# Patient Record
Sex: MALE | Race: WHITE | NOT HISPANIC OR LATINO | Employment: FULL TIME | URBAN - METROPOLITAN AREA
[De-identification: names, ages, dates, MRNs, and addresses within clinical notes are randomized per-mention and may not be internally consistent; named-entity substitution may affect disease eponyms.]

---

## 2017-06-13 ENCOUNTER — CONVERSION ENCOUNTER (OUTPATIENT)
Dept: FAMILY MEDICINE CLINIC | Facility: CLINIC | Age: 42
End: 2017-06-13

## 2019-06-04 VITALS
RESPIRATION RATE: 16 BRPM | SYSTOLIC BLOOD PRESSURE: 124 MMHG | WEIGHT: 191 LBS | DIASTOLIC BLOOD PRESSURE: 82 MMHG | BODY MASS INDEX: 26.64 KG/M2 | HEART RATE: 72 BPM

## 2022-09-08 ENCOUNTER — OFFICE VISIT (OUTPATIENT)
Dept: FAMILY MEDICINE CLINIC | Facility: CLINIC | Age: 47
End: 2022-09-08

## 2022-09-08 ENCOUNTER — TELEPHONE (OUTPATIENT)
Dept: FAMILY MEDICINE CLINIC | Facility: CLINIC | Age: 47
End: 2022-09-08

## 2022-09-08 VITALS
RESPIRATION RATE: 14 BRPM | DIASTOLIC BLOOD PRESSURE: 86 MMHG | BODY MASS INDEX: 25.05 KG/M2 | SYSTOLIC BLOOD PRESSURE: 142 MMHG | WEIGHT: 175 LBS | HEART RATE: 75 BPM | HEIGHT: 70 IN | OXYGEN SATURATION: 99 %

## 2022-09-08 DIAGNOSIS — Z00.00 PREVENTATIVE HEALTH CARE: ICD-10-CM

## 2022-09-08 DIAGNOSIS — Z13.220 SCREENING, LIPID: ICD-10-CM

## 2022-09-08 DIAGNOSIS — Z11.59 ENCOUNTER FOR HEPATITIS C SCREENING TEST FOR LOW RISK PATIENT: Primary | ICD-10-CM

## 2022-09-08 DIAGNOSIS — R53.82 CHRONIC FATIGUE: ICD-10-CM

## 2022-09-08 DIAGNOSIS — R68.82 DECREASED SEX DRIVE: ICD-10-CM

## 2022-09-08 DIAGNOSIS — Z13.228 SCREENING FOR METABOLIC DISORDER: ICD-10-CM

## 2022-09-08 DIAGNOSIS — Z12.11 SCREEN FOR COLON CANCER: ICD-10-CM

## 2022-09-08 DIAGNOSIS — R03.0 ELEVATED BP WITHOUT DIAGNOSIS OF HYPERTENSION: ICD-10-CM

## 2022-09-08 PROCEDURE — 99386 PREV VISIT NEW AGE 40-64: CPT | Performed by: NURSE PRACTITIONER

## 2022-09-08 NOTE — TELEPHONE ENCOUNTER
At checkout patient requested to schedule follow up with Dr. Mac. Was est with Tamy today. Please advise if this is OK to schedule with Dr. COLON in 1 mo. Thank you.

## 2022-09-08 NOTE — PROGRESS NOTES
Subjective   Zhane García is a 47 y.o. male.     History of Present Illness   New patient here to establish care. He is c/o fatigue and low sex drive, wants to check testosterone. Due annual exam and labs.     Patient feels well overall except for some chronic fatigue and low sex drive times months.  He has taken [employer] supplement for testosterone replacement.  He is interested in having hormones checked and considering testosterone injections.  Patient denies erectile dysfunction or urinary changes.  He has had no fever, chills, weight loss.    Patient history of venous stasis ulcers to bilateral medial ankles.  He is under gone vein stripping in the past bilaterally.  No wounds.  Patient denies numbness or tingling.  Patient has stopped smoking recently.    The following portions of the patient's history were reviewed and updated as appropriate: allergies, current medications, past family history, past medical history, past social history, past surgical history and problem list.    Review of Systems   Constitutional: Positive for fatigue. Negative for activity change, diaphoresis, fever and unexpected weight loss.   HENT: Negative for congestion and sore throat.         Dental exam is utd     Eyes: Negative for visual disturbance.        Lasik     Respiratory: Negative for cough, shortness of breath and wheezing.    Cardiovascular: Negative for chest pain, palpitations and leg swelling.   Gastrointestinal: Negative for abdominal distention, blood in stool, constipation, diarrhea and GERD.   Endocrine: Negative for cold intolerance, heat intolerance, polydipsia, polyphagia and polyuria.   Genitourinary: Negative for dysuria, erectile dysfunction, scrotal swelling and urinary incontinence.   Musculoskeletal: Negative for back pain.   Skin: Negative for bruise.   Allergic/Immunologic: Negative for environmental allergies.   Neurological: Negative for syncope, weakness and headache.   Hematological: Does not  bruise/bleed easily.   Psychiatric/Behavioral: Positive for stress. Negative for sleep disturbance and depressed mood. The patient is nervous/anxious.        Objective   Physical Exam  Vitals reviewed.   Constitutional:       Appearance: Normal appearance. He is normal weight.   HENT:      Head: Normocephalic.      Right Ear: Tympanic membrane normal.      Left Ear: Tympanic membrane normal.      Nose: Nose normal.      Mouth/Throat:      Mouth: Mucous membranes are moist.   Eyes:      Pupils: Pupils are equal, round, and reactive to light.   Cardiovascular:      Rate and Rhythm: Normal rate and regular rhythm.      Pulses: Normal pulses.      Heart sounds: Normal heart sounds.   Pulmonary:      Effort: Pulmonary effort is normal.      Breath sounds: Normal breath sounds.   Abdominal:      General: Abdomen is flat. Bowel sounds are normal.      Palpations: Abdomen is soft.   Musculoskeletal:         General: Normal range of motion.      Cervical back: Normal range of motion.   Skin:     General: Skin is warm.   Neurological:      General: No focal deficit present.      Mental Status: He is alert.   Psychiatric:         Mood and Affect: Mood normal.         Vitals:    09/08/22 1049   BP: 142/86   Pulse: 75   Resp: 14   SpO2: 99%     Body mass index is 25.11 kg/m².    Procedures    Assessment & Plan   Problems Addressed this Visit    None     Visit Diagnoses     Encounter for hepatitis C screening test for low risk patient    -  Primary    Relevant Orders    Hepatitis C Antibody    Preventative health care        Chronic fatigue        Relevant Orders    TSH    Decreased sex drive        Elevated BP without diagnosis of hypertension        Relevant Orders    CBC & Differential    Comprehensive Metabolic Panel    TSH    Screening for metabolic disorder        Screening, lipid        Relevant Orders    Lipid Panel With / Chol / HDL Ratio    Screen for colon cancer        Relevant Orders    Cologuard - Stool, Per Rectum       Diagnoses       Codes Comments    Encounter for hepatitis C screening test for low risk patient    -  Primary ICD-10-CM: Z11.59  ICD-9-CM: V73.89     Preventative health care     ICD-10-CM: Z00.00  ICD-9-CM: V70.0     Chronic fatigue     ICD-10-CM: R53.82  ICD-9-CM: 780.79     Decreased sex drive     ICD-10-CM: R68.82  ICD-9-CM: 799.81     Elevated BP without diagnosis of hypertension     ICD-10-CM: R03.0  ICD-9-CM: 796.2     Screening for metabolic disorder     ICD-10-CM: Z13.228  ICD-9-CM: V77.99     Screening, lipid     ICD-10-CM: Z13.220  ICD-9-CM: V77.91     Screen for colon cancer     ICD-10-CM: Z12.11  ICD-9-CM: V76.51         Orders Placed This Encounter   Procedures   • Cologuard - Stool, Per Rectum     Standing Status:   Future     Standing Expiration Date:   9/8/2023     Order Specific Question:   Release to patient     Answer:   Routine Release   • Comprehensive Metabolic Panel     Order Specific Question:   Release to patient     Answer:   Routine Release   • Hepatitis C Antibody     Order Specific Question:   Release to patient     Answer:   Routine Release   • Lipid Panel With / Chol / HDL Ratio     Order Specific Question:   Release to patient     Answer:   Routine Release   • TSH     Order Specific Question:   Release to patient     Answer:   Routine Release   • CBC & Differential     Preventative care- Follow heart healthy diet, drink water, walk daily. Wear seatbelts, wear helmets, wear sunscreens. Follow CDC guidelines for covid pandemic.     Fatigue- check labs, encourage heart healthy diet, drink water.  Limit alcohol or chemical depressant.  Reviewed sleep hygiene    Elevated BP without diagnosis of hypertension-limit energy drinks/caffeine, drink water, low-sodium diet limit stress and stimulants, check lab    Low sex drive-refer to Dr. Mac.  Patient declines urology referral.  He is aware that this provider does not check or prescribe testosterone         Education provided in AVS    Return in about 1 month (around 10/8/2022) for Recheck.

## 2022-09-09 LAB
ALBUMIN SERPL-MCNC: 4.7 G/DL (ref 4–5)
ALBUMIN/GLOB SERPL: 2 {RATIO} (ref 1.2–2.2)
ALP SERPL-CCNC: 77 IU/L (ref 44–121)
ALT SERPL-CCNC: 23 IU/L (ref 0–44)
AST SERPL-CCNC: 25 IU/L (ref 0–40)
BASOPHILS # BLD AUTO: 0.1 X10E3/UL (ref 0–0.2)
BASOPHILS NFR BLD AUTO: 1 %
BILIRUB SERPL-MCNC: 0.3 MG/DL (ref 0–1.2)
BUN SERPL-MCNC: 15 MG/DL (ref 6–24)
BUN/CREAT SERPL: 14 (ref 9–20)
CALCIUM SERPL-MCNC: 9.6 MG/DL (ref 8.7–10.2)
CHLORIDE SERPL-SCNC: 100 MMOL/L (ref 96–106)
CHOLEST SERPL-MCNC: 181 MG/DL (ref 100–199)
CHOLEST/HDLC SERPL: 3.3 RATIO (ref 0–5)
CO2 SERPL-SCNC: 23 MMOL/L (ref 20–29)
CREAT SERPL-MCNC: 1.09 MG/DL (ref 0.76–1.27)
EGFRCR-CYS SERPLBLD CKD-EPI 2021: 85 ML/MIN/1.73
EOSINOPHIL # BLD AUTO: 0.3 X10E3/UL (ref 0–0.4)
EOSINOPHIL NFR BLD AUTO: 3 %
ERYTHROCYTE [DISTWIDTH] IN BLOOD BY AUTOMATED COUNT: 12.5 % (ref 11.6–15.4)
GLOBULIN SER CALC-MCNC: 2.3 G/DL (ref 1.5–4.5)
GLUCOSE SERPL-MCNC: 97 MG/DL (ref 65–99)
HCT VFR BLD AUTO: 44.6 % (ref 37.5–51)
HCV AB S/CO SERPL IA: <0.1 S/CO RATIO (ref 0–0.9)
HDLC SERPL-MCNC: 55 MG/DL
HGB BLD-MCNC: 15.1 G/DL (ref 13–17.7)
IMM GRANULOCYTES # BLD AUTO: 0 X10E3/UL (ref 0–0.1)
IMM GRANULOCYTES NFR BLD AUTO: 0 %
LDLC SERPL CALC-MCNC: 115 MG/DL (ref 0–99)
LYMPHOCYTES # BLD AUTO: 2.6 X10E3/UL (ref 0.7–3.1)
LYMPHOCYTES NFR BLD AUTO: 30 %
MCH RBC QN AUTO: 32.5 PG (ref 26.6–33)
MCHC RBC AUTO-ENTMCNC: 33.9 G/DL (ref 31.5–35.7)
MCV RBC AUTO: 96 FL (ref 79–97)
MONOCYTES # BLD AUTO: 0.8 X10E3/UL (ref 0.1–0.9)
MONOCYTES NFR BLD AUTO: 9 %
NEUTROPHILS # BLD AUTO: 4.9 X10E3/UL (ref 1.4–7)
NEUTROPHILS NFR BLD AUTO: 57 %
PLATELET # BLD AUTO: 335 X10E3/UL (ref 150–450)
POTASSIUM SERPL-SCNC: 4.7 MMOL/L (ref 3.5–5.2)
PROT SERPL-MCNC: 7 G/DL (ref 6–8.5)
RBC # BLD AUTO: 4.64 X10E6/UL (ref 4.14–5.8)
SODIUM SERPL-SCNC: 140 MMOL/L (ref 134–144)
TRIGL SERPL-MCNC: 59 MG/DL (ref 0–149)
TSH SERPL DL<=0.005 MIU/L-ACNC: 1.66 UIU/ML (ref 0.45–4.5)
VLDLC SERPL CALC-MCNC: 11 MG/DL (ref 5–40)
WBC # BLD AUTO: 8.7 X10E3/UL (ref 3.4–10.8)

## 2022-10-13 ENCOUNTER — OFFICE VISIT (OUTPATIENT)
Dept: FAMILY MEDICINE CLINIC | Facility: CLINIC | Age: 47
End: 2022-10-13

## 2022-10-13 VITALS
RESPIRATION RATE: 18 BRPM | SYSTOLIC BLOOD PRESSURE: 128 MMHG | DIASTOLIC BLOOD PRESSURE: 82 MMHG | HEART RATE: 68 BPM | WEIGHT: 175 LBS | BODY MASS INDEX: 25.11 KG/M2 | OXYGEN SATURATION: 99 %

## 2022-10-13 DIAGNOSIS — R68.82 DECREASED LIBIDO: ICD-10-CM

## 2022-10-13 DIAGNOSIS — R53.83 OTHER FATIGUE: Primary | ICD-10-CM

## 2022-10-13 PROCEDURE — 99213 OFFICE O/P EST LOW 20 MIN: CPT | Performed by: FAMILY MEDICINE

## 2022-10-13 NOTE — PROGRESS NOTES
Chief Complaint  chronic fatigue    Subjective    History of Present Illness {CC  Problem List  Visit  Diagnosis   Encounters  Notes  Medications  Labs  Result Review Imaging  Media :23}     Zhane García presents to [facility] for chronic fatigue.  History of Present Illness     Here with concerns for some chronic fatigue and decreased libido. Was advised to make this appointment by his current girlfriend. They have been dating about 5 months now and she has expressed some concern over his libido. He feels that things are overall going fairly well, only that he is rather busy and stressed these days.    Exercises regularly, is more or less a single parent to 2 adolescent children. Works full-time and travels regularly. Recent blood work was overall reassuring. Has no problems with erectile function. Able to achieve erection and orgasm without difficulty.    Objective     Vital Signs:   /82   Pulse 68   Resp 18   Wt 79.4 kg (175 lb)   SpO2 99%   BMI 25.11 kg/m²   Physical Exam  Vitals and nursing note reviewed.   Constitutional:       General: He is not in acute distress.     Appearance: Normal appearance. He is not ill-appearing.   Cardiovascular:      Rate and Rhythm: Normal rate and regular rhythm.      Pulses: Normal pulses.      Heart sounds: Normal heart sounds. No murmur heard.  Pulmonary:      Effort: Pulmonary effort is normal. No respiratory distress.      Breath sounds: Normal breath sounds. No rales.   Neurological:      Mental Status: He is alert and oriented to person, place, and time. Mental status is at baseline.   Psychiatric:         Mood and Affect: Mood normal.         Behavior: Behavior normal.          Result Review  Data Reviewed:{ Labs  Result Review  Imaging  Med Tab  Media :23}                   Assessment and Plan {CC Problem List  Visit Diagnosis  ROS  Review (Popup)  Health Maintenance  Quality  BestPractice  Medications   TapTrack  SnapShot Encounters  Media :23}   Diagnoses and all orders for this visit:    1. Other fatigue (Primary)    2. Decreased libido    Long discussion about libido changes in men as they get older. Sounds as if some more open communication between him and his girlfriend might be helpful. He defers checking testosterone for now. We could certainly reconsider in the future if needed. Discussed risks of long-term testosterone supplementation.    Follow-up as needed. Encouraged communication via [employer] in the meantime.    Patient was given instructions and counseling regarding his condition or for health maintenance advice. Please see specific information pulled into the AVS (placed there by myself) if appropriate.    Return if symptoms worsen or fail to improve.      GARRETT Mac MD

## 2023-07-13 ENCOUNTER — TELEPHONE (OUTPATIENT)
Dept: PODIATRY | Facility: CLINIC | Age: 48
End: 2023-07-13

## 2023-07-13 NOTE — TELEPHONE ENCOUNTER
Provider: DR FORBES   Caller: YAJAIRA ESCAMILLA   Relationship to Patient: PATIENT   Pharmacy:   [facility][id number] - [facility] [place] - [address] AT [address] [place] [address] & [facility] - [phone]  - [phone]    [address] [place] [address]   Phone: [phone] Fax: [phone]     Phone Number: [phone]  Reason for Call: PATIENT SEEN TODAY 07/13/23.  RX methylPREDNISolone (MEDROL) 4 MG dose pack   HAS NOT BEEN RECEIVED AT PHARMACY.      PATIENT STATES HE IS GOING OUT OF TOWN AND WOULD LIKE THE RX SENT ONLY ONE TIME TO   [facility]  [address] [place] [address] [place]  [phone]

## 2023-07-13 NOTE — TELEPHONE ENCOUNTER
I called  Zhane and told him that we have conformation of the medication at 11:52 a.m and it was sent to the [facility] in [place] on [address]. He verbally understood and said he would pick it up later today.

## 2023-07-27 ENCOUNTER — OFFICE VISIT (OUTPATIENT)
Dept: PODIATRY | Facility: CLINIC | Age: 48
End: 2023-07-27
Payer: COMMERCIAL

## 2023-07-27 VITALS — RESPIRATION RATE: 20 BRPM | HEIGHT: 70 IN | WEIGHT: 175 LBS | OXYGEN SATURATION: 98 % | BODY MASS INDEX: 25.05 KG/M2

## 2023-07-27 DIAGNOSIS — S86.011A ACHILLES TENDON RUPTURE, RIGHT, INITIAL ENCOUNTER: ICD-10-CM

## 2023-07-27 DIAGNOSIS — M79.604 RIGHT LEG PAIN: Primary | ICD-10-CM

## 2023-07-27 NOTE — PROGRESS NOTES
"07/27/2023  Foot and Ankle Surgery - Established Patient/Follow-up  Provider: Dr. Guicho Woodson DPM  Location: [facility]    Subjective:  Zhane García is a 48 y.o. male.     Chief Complaint   Patient presents with    Right Foot - Pain       achiles rupture        Follow-up     WILDER Fairchild 9/8/2022       HPI: Zhane García is a 48-year-old male who presents to the office today for a follow-up regarding his right Achilles tendon.    The patient is doing well overall and notes improvement in his pain. He states that the aggravating pain is nearly resolved as long as he is not trying to apply pressure. He has attempted to ambulate in the boot without crutches; however, he has not attempted to bear weight on his right foot. The patient has attempted to rest his right foot when standing. He works as a . He is 3 weeks status post injury.    No Known Allergies    Current Outpatient Medications on File Prior to Visit   Medication Sig Dispense Refill    HYDROcodone-acetaminophen (NORCO) 5-325 MG per tablet Take 1 tablet by mouth Every 6 (Six) Hours As Needed for Severe Pain. 12 tablet 0    ibuprofen (ADVIL,MOTRIN) 600 MG tablet Take 1 tablet by mouth Every 6 (Six) Hours As Needed for Mild Pain. 30 tablet 0    methylPREDNISolone (MEDROL) 4 MG dose pack Take as directed 21 tablet 0     No current facility-administered medications on file prior to visit.       Objective   Resp 20   Ht 177.8 cm (70\")   Wt 79.4 kg (175 lb)   SpO2 98%   BMI 25.11 kg/m²     Foot/Ankle Exam    GENERAL  Orientation:  AAOx3  Affect:  appropriate    VASCULAR     Right Foot Vascularity   Normal vascular exam    Dorsalis pedis:  2+  Posterior tibial:  2+  Skin temperature:  warm  Edema grading:  None  CFT:  < 3 seconds  Pedal hair growth:  Present  Varicosities:  none     Left Foot Vascularity   Normal vascular exam    Dorsalis pedis:  2+  Posterior tibial:  2+  Skin temperature:  warm  Edema grading:  None  CFT:  < 3 " seconds  Pedal hair growth:  Present  Varicosities:  none     NEUROLOGIC     Right Foot Neurologic   Light touch sensation: normal  Hot/Cold sensation: normal  Achilles reflex:  2+     Left Foot Neurologic   Light touch sensation: normal  Hot/Cold sensation:  normal  Achilles reflex:  2+    MUSCULOSKELETAL     Right Foot Musculoskeletal   Arch:  Normal     Left Foot Musculoskeletal   Arch:  Normal    MUSCLE STRENGTH     Right Foot Muscle Strength   Normal strength    Foot dorsiflexion:  5  Foot plantar flexion:  5  Foot inversion:  5  Foot eversion:  5     Left Foot Muscle Strength   Normal strength    Foot dorsiflexion:  5  Foot plantar flexion:  5  Foot inversion:  5  Foot eversion:  5    DERMATOLOGIC      Right Foot Dermatologic   Skin  Right foot skin is intact.   Nails comment:  Nails 1-5     Left Foot Dermatologic   Skin  Left foot skin is intact.   Nails comment:  Nails 1-5    TESTS     Right Foot Tests   Anterior drawer: negative  Varus tilt: negative     Left Foot Tests   Anterior drawer: negative  Varus tilt: negative     Right foot additional comments: Moderate discomfort with palpation involving the myotendinous juncture of the right lower extremity. Significant inflammation and swelling to this area. No gross deformity or instability. Limited range of motion and muscle strength testing secondary to guarding.    0727/2023  Continued discomfort involving the calf region. Mild hypertrophy. No progressive disruption involving the Achilles tendon.    Assessment & Plan   Diagnoses and all orders for this visit:    1. Right leg pain (Primary)    2. Achilles tendon rupture, right, initial encounter  -     Ambulatory Referral to Physical Therapy Evaluate and treat      The patient is a 48-year-old male who presents to the office today for a follow-up regarding his right Achilles tendon. He reports improvement in his symptoms since his last visit. We removed his wedge today. I advised the patient to begin partial  weight-bearing with the use of crutches. He may discontinue use of the crutches as he feels comfortable. I recommend physical therapy. I advised the patient to move his right ankle and foot. Continue to wear the boot for another 2 weeks. We discussed PRP injections. The patient will return to the office in 3 weeks for reevaluation. Greater than 20 minutes was spent before, during, and after evaluation for patient care.    Orders Placed This Encounter   Procedures    Ambulatory Referral to Physical Therapy Evaluate and treat     Referral Priority:   Routine     Referral Type:   Physical Therapy     Referral Reason:   Specialty Services Required     Requested Specialty:   Physical Therapy     Number of Visits Requested:   1          Note is dictated utilizing voice recognition software. Unfortunately this leads to occasional typographical errors. I apologize in advance if the situation occurs. If questions occur please do not hesitate to call our office.    Transcribed from ambient dictation for YOSI Woodson DPM by Dov Edwards.  07/27/23   10:58 EDT    Patient or patient representative verbalized consent to the visit recording.  I have personally performed the services described in this document as transcribed by the above individual, and it is both accurate and complete.

## 2023-08-10 ENCOUNTER — TREATMENT (OUTPATIENT)
Dept: PHYSICAL THERAPY | Facility: CLINIC | Age: 48
End: 2023-08-10
Payer: COMMERCIAL

## 2023-08-10 DIAGNOSIS — R26.9 GAIT DISTURBANCE: ICD-10-CM

## 2023-08-10 DIAGNOSIS — S86.011A ACHILLES TENDON RUPTURE, RIGHT, INITIAL ENCOUNTER: Primary | ICD-10-CM

## 2023-08-10 NOTE — PROGRESS NOTES
" Physical Therapy Initial Evaluation and Plan of Care        [address], [address] [place] [address]     Patient: Zhane García   : 1975  Diagnosis/ICD-10 Code:  Achilles tendon rupture, right, initial encounter [S86.011A]  Referring practitioner: YOSI Woodson DPM  Date of Initial Visit: 8/10/2023  Today's Date: 8/10/2023  Patient seen for 1 sessions           Subjective Questionnaire: FAAM:  42% limited       Subjective Evaluation    History of Present Illness  Onset date: 23.  Mechanism of injury: Subjective report per Dr. Woodson at office visit on 23:  \"He reports he was attempting to push a truck out of a yard on 2023, he fell and felt as if a \"rubber band\" snapped in the posterior aspect of his calf. The patient states he was seen by Dr. Albarado, the emergency room physician, who ordered an MRI. He notes he was informed to stay off of his right lower extremity. He reports he was prescribed hydrocodone, Tylenol, and ibuprofen.\"             Patient Occupation:  and works from home primarily Pain  Current pain rating: 3  At best pain ratin  At worst pain ratin  Location: right ankle and calf  Quality: dull ache  Relieving factors: rest and change in position  Aggravating factors: sleeping and stairs  Progression: no change    Social Support  Lives in: condominium  Lives with: young children and adult children    Hand dominance: right    Diagnostic Tests  MRI studies: abnormal    Treatments  Previous treatment: medication and immobilization  Current treatment: physical therapy  Patient Goals  Patient goals for therapy: independence with ADLs/IADLs, decreased edema, decreased pain, improved balance, increased motion and increased strength  Patient goal: housework, walking and gym       Precautions: cam boot with heel wedge; gentle ROM, no weightbearing, conservative care, avoid high impact or excessive activity    MRI 23 Findings:  The Achilles tendon is " ruptured at the myotendinous junction of the tendon with the soleus muscle. The proximal tendon is mildly retracted and serpiginous in contour. The remainder of the Achilles tendon demonstrates some increased signal within its   substance. It appears to insert normally distally. There is fluid and edema at the myotendinous junction of the soleus muscle with tendon. There is a small amount of edema and fluid in the fat anterior to the distal Achilles tendon. There is no acute   marrow edema. The ankle joint is maintained. The peroneus longus and brevis tendons appear to be intact as do the medial flexor tendons.     IMPRESSION:  Impression:  The Achilles tendon is ruptured at the myotendinous junction with the soleus muscle. There is mild separation of the tendon ends. Surrounding fluid presumably represents hemorrhage.    Objective          Observations     Right Ankle/Foot   Positive for edema.       Palpation     Right   No palpable tenderness to the anterior tibialis, lateral gastrocnemius, medial gastrocnemius, posterior tibialis and soleus.   Hypertonic in the peroneus. Tenderness of the peroneus.     Additional Palpation Details  Swelling at soleus R    Tenderness     Right Ankle/Foot   No tenderness in the Achilles insertion, anterior ankle, anterior talofibular ligament, fifth metatarsal base, calcaneofibular ligament, deltoid ligament, dorsum foot, fibula, first metatarsal head, lateral malleolus, medial calcaneus, medial malleolus, metatarsal head, navicular, peroneal tendon and plantar fascia.     Neurological Testing     Sensation     Ankle/Foot     Right Ankle/Foot   Intact: light touch     Additional Neurological Details  Denies paraesthesias    Active Range of Motion     Right Ankle/Foot   Plantar flexion: 50 degrees   Inversion: 45 degrees   Eversion: 10 degrees     Additional Active Range of Motion Details  -5 degrees of ankle DF R    Joint Play     Right Ankle/Foot  Joints within functional limits  are the forefoot. Hypomobile in the fibular head.     Strength/Myotome Testing     Right Ankle/Foot   Dorsiflexion: 5  Inversion: 4+  Eversion: 5  Great toe flexion: 4+  Great toe extension: 4+    Swelling   Left Ankle/Foot   Figure 8: 53 cm    Right Ankle/Foot   Figure 8: 53 cm     General Comments     Ankle/Foot Comments   Swelling in right calf and distal soleus mm   See Exercise, Manual, and Modality Logs for complete treatment.     Assessment & Plan     Assessment  Impairments: abnormal gait, abnormal muscle tone, abnormal or restricted ROM, activity intolerance, impaired balance, impaired physical strength, lacks appropriate home exercise program, pain with function and weight-bearing intolerance  Functional Limitations: sleeping, walking, uncomfortable because of pain and standing  Assessment details: The patient is a 48 y.o. male who presents to physical therapy today for right achilles tendon rupture. Upon initial evaluation, the patient demonstrates the following impairments: right ankle ROM and strength deficits, gait deficits, functional impairments. Due to these impairments, the patient is unable to walk with weight on the right foot, perform ADLs, perform housework, and gym workouts, . The patient would benefit from skilled PT services to address functional limitations and impairments and to improve patient quality of life.        Goals  Plan Goals: STGs (4 weeks):    1.  Pt will be able to tolerated initial HEP.  2.  Pt will demonstrate increased R ankle DF to neutral  3.  Pt will be able to walk in boot without crutches.    LTGs (12 weeks):  1.  Pt will be I with HEP.  2.  Pt will demonstrate R ankle DF to 20 degrees.  3.  Pt will be able to perform housework with no more than 2/10 pain level.      Plan  Therapy options: will be seen for skilled therapy services  Planned modality interventions: cryotherapy, dry needling, high voltage pulsed current (pain management), TENS, thermotherapy  (hydrocollator packs) and ultrasound  Planned therapy interventions: manual therapy, neuromuscular re-education, flexibility, functional ROM exercises, gait training, home exercise program, joint mobilization, soft tissue mobilization, strengthening, stretching, therapeutic activities and balance/weight-bearing training  Frequency: 2x week  Duration in weeks: 12  Treatment plan discussed with: patient      Visit Diagnoses:    ICD-10-CM ICD-9-CM   1. Achilles tendon rupture, right, initial encounter  S86.011A 845.09   2. Gait disturbance  R26.9 781.2       History # of Personal Factors and/or Comorbidities: LOW (0)  Examination of Body System(s): # of elements: LOW (1-2)  Clinical Presentation: STABLE   Clinical Decision Making: LOW       Timed:         Manual Therapy:         mins  44554;     Therapeutic Exercise:    10     mins  76933;     Neuromuscular Melecio:        mins  20562;    Therapeutic Activity:          mins  78740;     Gait Training:           mins  27906;     Ultrasound:          mins  75849;    Ionto                                   mins   35129  Self Care                            mins   93143  Canalith Repos         mins 60262      Un-Timed:  Electrical Stimulation:         mins  05171 ( );  Dry Needling          mins self-pay  Traction          mins 47049  Low Eval     35     Mins  77437  Mod Eval          Mins  37744  High Eval                            Mins  37519  Re-Eval                               mins  78794    Timed Treatment:   10   mins   Total Treatment:     45   mins    PT SIGNATURE: Aishwarya Albright PT         Initial Certification  Certification Period: 8/10/2023 thru 11/8/2023  I certify that the therapy services are furnished while this patient is under my care.  The services outlined above are required by this patient, and will be reviewed every 90 days.     PHYSICIAN: YOSI Woodson DPM      DATE:     Please sign and return via fax to [phone].. Thank you, [facility].

## 2023-08-17 ENCOUNTER — TREATMENT (OUTPATIENT)
Dept: PHYSICAL THERAPY | Facility: CLINIC | Age: 48
End: 2023-08-17
Payer: COMMERCIAL

## 2023-08-17 DIAGNOSIS — R26.9 GAIT DISTURBANCE: ICD-10-CM

## 2023-08-17 DIAGNOSIS — S86.011A ACHILLES TENDON RUPTURE, RIGHT, INITIAL ENCOUNTER: Primary | ICD-10-CM

## 2023-08-17 NOTE — PROGRESS NOTES
Physical Therapy Daily Treatment Note  [facility], [address] [place] [address]     Patient: Zhane García   : 1975  Referring practitioner: YOSI Woodson DPM  Diagnosis:      ICD-10-CM ICD-9-CM   1. Achilles tendon rupture, right, initial encounter  S86.011A 845.09   2. Gait disturbance  R26.9 781.2     Date of Initial Visit: Type: THERAPY  Noted: 8/10/2023  Today's Date: 2023    VISIT#: 2          Subjective   Zhane García reports: he has been walking without crutches for 2 days with some increased fatigue noted from wearing boot and not having full motion, but no increased pain or swelling.      Objective   See Exercise, Manual, and Modality Logs for complete treatment.       Assessment & Plan       Assessment  Assessment details: He will see Dr. Woodson on Tuesday and will progress as able per restrictions provided.          Progress per Plan of Care and Progress strengthening /stabilization /functional activity           Timed:  Manual Therapy:    12     mins  88581;  Therapeutic Exercise:    11     mins  32254;     Neuromuscular Melecio:        mins  93061;    Therapeutic Activity:          mins  66622;     Gait Training:           mins  85767;     Ultrasound:          mins  63916;    Electrical Stimulation:         mins  32202 ( );    Untimed:  Electrical Stimulation:    20     mins  19356 ( );  Mechanical Traction:         mins  19055;   Dry needling:       Self pay    Timed Treatment:   23   mins   Total Treatment:     43   mins  Aishwarya Albright, PT, DPT, CLT, CIDN  Physical Therapist

## 2023-08-22 ENCOUNTER — OFFICE VISIT (OUTPATIENT)
Dept: PODIATRY | Facility: CLINIC | Age: 48
End: 2023-08-22
Payer: COMMERCIAL

## 2023-08-22 VITALS — OXYGEN SATURATION: 98 % | WEIGHT: 175 LBS | HEIGHT: 70 IN | HEART RATE: 81 BPM | BODY MASS INDEX: 25.05 KG/M2

## 2023-08-22 DIAGNOSIS — S86.011A ACHILLES TENDON RUPTURE, RIGHT, INITIAL ENCOUNTER: ICD-10-CM

## 2023-08-22 DIAGNOSIS — M79.604 RIGHT LEG PAIN: Primary | ICD-10-CM

## 2023-08-22 NOTE — PROGRESS NOTES
"08/22/2023  Foot and Ankle Surgery - Established Patient/Follow-up  Provider: Dr. Guicho Woodson DPM  Location: [facility]    Subjective:  Zhane García is a 48 y.o. male.     Chief Complaint   Patient presents with    Right Lower Leg - Follow-up     Achilles Rupture     Follow-up     NORIS ARVIZU 10/2022       HPI: The patient is a 48-year-old male who presents to the clinic for a follow-up regarding his right Achilles tendon. He is accompanied by an adult female.    The patient states he is doing well and has been without crutches for the last week. He reports he has attended 2 sessions of physical therapy. He notes he has been wearing the boot for 5 to 6 weeks. The patient states he has been trying to wean himself out of the boot around the house.    No Known Allergies    Current Outpatient Medications on File Prior to Visit   Medication Sig Dispense Refill    HYDROcodone-acetaminophen (NORCO) 5-325 MG per tablet Take 1 tablet by mouth Every 6 (Six) Hours As Needed for Severe Pain. (Patient not taking: Reported on 8/22/2023) 12 tablet 0    ibuprofen (ADVIL,MOTRIN) 600 MG tablet Take 1 tablet by mouth Every 6 (Six) Hours As Needed for Mild Pain. (Patient not taking: Reported on 8/22/2023) 30 tablet 0    methylPREDNISolone (MEDROL) 4 MG dose pack Take as directed (Patient not taking: Reported on 8/22/2023) 21 tablet 0     No current facility-administered medications on file prior to visit.       Objective   Pulse 81   Ht 177.8 cm (70\")   Wt 79.4 kg (175 lb)   SpO2 98%   BMI 25.11 kg/mý     Foot/Ankle Exam  GENERAL  Orientation:  AAOx3  Affect:  appropriate    VASCULAR     Right Foot Vascularity   Normal vascular exam    Dorsalis pedis:  2+  Posterior tibial:  2+  Skin temperature:  warm  Edema grading:  None  CFT:  < 3 seconds  Pedal hair growth:  Present  Varicosities:  none     Left Foot Vascularity   Normal vascular exam    Dorsalis pedis:  2+  Posterior tibial:  2+  Skin temperature:  " warm  Edema grading:  None  CFT:  < 3 seconds  Pedal hair growth:  Present  Varicosities:  none     NEUROLOGIC     Right Foot Neurologic   Light touch sensation: normal  Hot/Cold sensation: normal  Achilles reflex:  2+     Left Foot Neurologic   Light touch sensation: normal  Hot/Cold sensation:  normal  Achilles reflex:  2+    MUSCULOSKELETAL     Right Foot Musculoskeletal   Arch:  Normal     Left Foot Musculoskeletal   Arch:  Normal    MUSCLE STRENGTH     Right Foot Muscle Strength   Normal strength    Foot dorsiflexion:  5  Foot plantar flexion:  5  Foot inversion:  5  Foot eversion:  5     Left Foot Muscle Strength   Normal strength    Foot dorsiflexion:  5  Foot plantar flexion:  5  Foot inversion:  5  Foot eversion:  5    DERMATOLOGIC      Right Foot Dermatologic   Skin  Right foot skin is intact.   Nails comment:  Nails 1-5     Left Foot Dermatologic   Skin  Left foot skin is intact.   Nails comment:  Nails 1-5    TESTS     Right Foot Tests   Anterior drawer: negative  Varus tilt: negative     Left Foot Tests   Anterior drawer: negative  Varus tilt: negative     Right foot additional comments: Moderate discomfort with palpation involving the myotendinous juncture of the right lower extremity. Significant inflammation and swelling to this area. No gross deformity or instability. Limited range of motion and muscle strength testing secondary to guarding.    0727/2023  Continued discomfort involving the calf region. Mild hypertrophy. No progressive disruption involving the Achilles tendon.    08/22/2023: Less swelling and discomfort involving the calf region. Strength is improving slowly. No additional issues or concerns today.    Assessment & Plan   Diagnoses and all orders for this visit:    1. Right leg pain (Primary)    2. Achilles tendon rupture, right, initial encounter        The patient is a 48-year-old male who presents to the office today for a follow-up regarding his right Achilles tendon. His strength  is improving slowly. No additional issues or concerns today. I advised the patient to begin transitioning out of the boot and progress with physical therapy. I advised the patient to perform low impact exercises. The patient will return to the office in 6 weeks for reevaluation. Greater than 20 minutes was spent before, during, and after evaluation for patient care.      No orders of the defined types were placed in this encounter.         Note is dictated utilizing voice recognition software. Unfortunately this leads to occasional typographical errors. I apologize in advance if the situation occurs. If questions occur please do not hesitate to call our office.    Transcribed from ambient dictation for YOSI Woodson DPM by Purvi Browne.  08/22/23   10:17 EDT    Patient or patient representative verbalized consent to the visit recording.  I have personally performed the services described in this document as transcribed by the above individual, and it is both accurate and complete.

## 2023-08-23 ENCOUNTER — TREATMENT (OUTPATIENT)
Dept: PHYSICAL THERAPY | Facility: CLINIC | Age: 48
End: 2023-08-23
Payer: COMMERCIAL

## 2023-08-23 DIAGNOSIS — R26.9 GAIT DISTURBANCE: ICD-10-CM

## 2023-08-23 DIAGNOSIS — S86.011A ACHILLES TENDON RUPTURE, RIGHT, INITIAL ENCOUNTER: Primary | ICD-10-CM

## 2023-08-23 NOTE — PROGRESS NOTES
Physical Therapy Daily Treatment Note  [facility] [place] [facility], [address] [place] [address]     Patient: Zhane García   : 1975  Referring practitioner: YOSI Woodson DPM  Diagnosis:      ICD-10-CM ICD-9-CM   1. Achilles tendon rupture, right, initial encounter  S86.011A 845.09   2. Gait disturbance  R26.9 781.2     Date of Initial Visit: Type: THERAPY  Noted: 8/10/2023  Today's Date: 2023    VISIT#: 3          Subjective   Zhane García reports: his doctor stated to gradually ween from the boot.  He presents to OP PT without crutches today.      Objective   See Exercise, Manual, and Modality Logs for complete treatment.       Assessment & Plan       Assessment  Assessment details: Added ankle ROM and functional hip strengthening exercises with cues for proper form and execution.    .        Progress per Plan of Care and Progress strengthening /stabilization /functional activity           Timed:  Manual Therapy:         mins  23848;  Therapeutic Exercise:    10     mins  60754;     Neuromuscular Melecio:    8    mins  60403;    Therapeutic Activity:     10     mins  29622;     Gait Trainin     mins  23964;     Ultrasound:          mins  29605;    Electrical Stimulation:         mins  54677 ( );    Untimed:  Electrical Stimulation:         mins  20072 ( );  Mechanical Traction:         mins  09974;   Dry needling:       Self pay    Timed Treatment:   36   mins   Total Treatment:     36   mins  Aishwarya Albright, PT, DPT, CLT, CIDN  Physical Therapist

## 2023-08-25 ENCOUNTER — TREATMENT (OUTPATIENT)
Dept: PHYSICAL THERAPY | Facility: CLINIC | Age: 48
End: 2023-08-25
Payer: COMMERCIAL

## 2023-08-25 DIAGNOSIS — R26.9 GAIT DISTURBANCE: ICD-10-CM

## 2023-08-25 DIAGNOSIS — S86.011A ACHILLES TENDON RUPTURE, RIGHT, INITIAL ENCOUNTER: Primary | ICD-10-CM

## 2023-08-25 NOTE — PROGRESS NOTES
Physical Therapy Daily Treatment Note    [address] [place]      Patient: Zhane García   : 1975  Referring practitioner: YOSI Woodson DPM  Date of Initial Visit: Type: THERAPY  Noted: 8/10/2023  Today's Date: 2023  Patient seen for 4 sessions       Visit Diagnoses:    ICD-10-CM ICD-9-CM   1. Achilles tendon rupture, right, initial encounter  S86.011A 845.09   2. Gait disturbance  R26.9 781.2       Subjective Evaluation    History of Present Illness    Subjective comment: doing better. more tight than pain. out of boot now. agrees he can use a lift in each shoe for transition period due to mild limp this morning.     Objective   See Exercise, Manual, and Modality Logs for complete treatment.       Assessment & Plan       Assessment  Assessment details: Did well today ex out of boot. Wearing flat tennis shoe vs. Running shoe with heel/arch so tighter heel toe gait today. Should do better trying his lifts in his shoes. Can use adjusta-lift if the rubber heel lifts are uncomfortable.     P: cont with light ex per DPM        Timed:         Manual Therapy:         mins  10666;     Therapeutic Exercise:    30     mins  49816;     Neuromuscular Melecio:        mins  90275;    Therapeutic Activity:          mins  33752;     Gait Training:           mins  34199;     Ultrasound:          mins  97808;    Ionto                                   mins   58830  Self Care                            mins   51042  Canalith Repos         mins 63310  Work hardening   __   min 43499/17894      Un-Timed:  Electrical Stimulation:    15     mins  31404 ( );  Dry Needling          mins self-pay  Traction          mins 02931      Timed Treatment:   30   mins   Total Treatment:     45   mins    Zorre Zeno Kimura, PT  [place] License: [id number]    [place] License:  [id number]

## 2023-08-28 ENCOUNTER — TREATMENT (OUTPATIENT)
Dept: PHYSICAL THERAPY | Facility: CLINIC | Age: 48
End: 2023-08-28
Payer: COMMERCIAL

## 2023-08-28 DIAGNOSIS — S86.011A ACHILLES TENDON RUPTURE, RIGHT, INITIAL ENCOUNTER: Primary | ICD-10-CM

## 2023-08-28 DIAGNOSIS — R26.9 GAIT DISTURBANCE: ICD-10-CM

## 2023-08-28 NOTE — PROGRESS NOTES
Physical Therapy Daily Treatment Note  [address], [address] [place] [address]     Patient: Zhane García   : 1975  Referring practitioner: YOSI Woodson DPM  Diagnosis:      ICD-10-CM ICD-9-CM   1. Achilles tendon rupture, right, initial encounter  S86.011A 845.09   2. Gait disturbance  R26.9 781.2     Date of Initial Visit: Type: THERAPY  Noted: 8/10/2023  Today's Date: 2023    VISIT#: 5          Subjective   Zhane García reports: no pain today and has been waking without his boot since last Thursday.      Objective   See Exercise, Manual, and Modality Logs for complete treatment.       Assessment & Plan       Assessment  Assessment details: Pt progressing with weightbearing and proprioception/balance activities, maintaining low impact status.          Progress per Plan of Care and Progress strengthening /stabilization /functional activity           Timed:  Manual Therapy:         mins  83483;  Therapeutic Exercise:    10     mins  11535;     Neuromuscular Melecio:    10    mins  41222;    Therapeutic Activity:     15     mins  55049;     Gait Training:           mins  37610;     Ultrasound:          mins  26956;    Electrical Stimulation:         mins  13508 ( );    Untimed:  Electrical Stimulation:         mins  07675 ( );  Mechanical Traction:         mins  51262;   Dry needling:       Self pay    Timed Treatment:   35   mins   Total Treatment:     35   mins  Aishwarya Albright PT, STANFORDT, CLT, CIDN  Physical Therapist

## 2023-09-07 ENCOUNTER — TREATMENT (OUTPATIENT)
Dept: PHYSICAL THERAPY | Facility: CLINIC | Age: 48
End: 2023-09-07
Payer: COMMERCIAL

## 2023-09-07 DIAGNOSIS — R26.9 GAIT DISTURBANCE: ICD-10-CM

## 2023-09-07 DIAGNOSIS — S86.011A ACHILLES TENDON RUPTURE, RIGHT, INITIAL ENCOUNTER: Primary | ICD-10-CM

## 2023-09-07 NOTE — PROGRESS NOTES
Physical Therapy Daily Treatment Note  [facility] [facility] [facility], [address] [place] [address]     Patient: Zhane García   : 1975  Referring practitioner: YOSI Woodson DPM  Diagnosis:      ICD-10-CM ICD-9-CM   1. Achilles tendon rupture, right, initial encounter  S86.011A 845.09   2. Gait disturbance  R26.9 781.2     Date of Initial Visit: Type: THERAPY  Noted: 8/10/2023  Today's Date: 2023    VISIT#: 6          Subjective   Zhane García reports: some stiffness in the mornings and he has some uncertainty when descending stairs.      Objective   See Exercise, Manual, and Modality Logs for complete treatment.       Assessment & Plan       Assessment  Assessment details: Progressed balance and proprioception exercises today with good tolerance, but some shakiness in ankles.  Gait is normalizing.        Progress per Plan of Care and Progress strengthening /stabilization /functional activity           Timed:  Manual Therapy:         mins  85649;  Therapeutic Exercise:    10     mins  14033;     Neuromuscular Melecio:    10    mins  03608;    Therapeutic Activity:     10     mins  70534;     Gait Training:           mins  32290;     Ultrasound:          mins  39435;    Electrical Stimulation:         mins  97474 ( );    Untimed:  Electrical Stimulation:         mins  00561 ( );  Mechanical Traction:         mins  48753;   Dry needling:       Self pay    Timed Treatment:   30   mins   Total Treatment:     30   mins  Aishwarya Albright PT, DPT, CLT, CIDN  Physical Therapist

## 2023-09-11 ENCOUNTER — TREATMENT (OUTPATIENT)
Dept: PHYSICAL THERAPY | Facility: CLINIC | Age: 48
End: 2023-09-11
Payer: COMMERCIAL

## 2023-09-11 DIAGNOSIS — S86.011A ACHILLES TENDON RUPTURE, RIGHT, INITIAL ENCOUNTER: Primary | ICD-10-CM

## 2023-09-11 DIAGNOSIS — R26.9 GAIT DISTURBANCE: ICD-10-CM

## 2023-09-11 NOTE — PROGRESS NOTES
Physical Therapy Daily Treatment Note  [address] [facility], [address] [place] [address]     Patient: Zhane García   : 1975  Referring practitioner: YOSI Woodson DPM  Diagnosis:      ICD-10-CM ICD-9-CM   1. Achilles tendon rupture, right, initial encounter  S86.011A 845.09   2. Gait disturbance  R26.9 781.2     Date of Initial Visit: Type: THERAPY  Noted: 8/10/2023  Today's Date: 2023    VISIT#: 7          Subjective   Zhane García reports: he feels like his walking is better and he has more confidence with descending stairs now.  No pain today.     Objective   See Exercise, Manual, and Modality Logs for complete treatment.       Assessment & Plan       Assessment  Assessment details: Increased intensity with more resistance and reps and SLS calf raises today with good tolerance.  Manual performed to decrease soft tissue tension.          Progress per Plan of Care and Progress strengthening /stabilization /functional activity           Timed:  Manual Therapy:   8      mins  43833;  Therapeutic Exercise:    10     mins  24769;     Neuromuscular Melecio:    10    mins  68192;    Therapeutic Activity:     10     mins  95881;     Gait Training:           mins  72496;     Ultrasound:          mins  58194;    Electrical Stimulation:         mins  38630 ( );    Untimed:  Electrical Stimulation:         mins  14054 ( );  Mechanical Traction:         mins  11339;   Dry needling:       Self pay    Timed Treatment:   38   mins   Total Treatment:     38   mins  Aishwarya Albright PT, DPT, CLT, CIDN  Physical Therapist

## 2023-09-14 ENCOUNTER — TREATMENT (OUTPATIENT)
Dept: PHYSICAL THERAPY | Facility: CLINIC | Age: 48
End: 2023-09-14
Payer: COMMERCIAL

## 2023-09-14 DIAGNOSIS — S86.011A ACHILLES TENDON RUPTURE, RIGHT, INITIAL ENCOUNTER: Primary | ICD-10-CM

## 2023-09-14 DIAGNOSIS — R26.9 GAIT DISTURBANCE: ICD-10-CM

## 2023-09-14 NOTE — PROGRESS NOTES
Physical Therapy Progress  Note/Authorization Note/Discharge Note   [address], [address] [place] [address]     Patient: Zhane García   : 1975  Referring practitioner: YOSI Woodson DPM  Date of Initial Visit: No linked episodes  Today's Date: 2023    VISIT#: 8          Subjective   Zhane García reports: no pain and states it feels pretty good today.      Objective          Active Range of Motion     Right Ankle/Foot   Dorsiflexion (ke): 20 degrees     Passive Range of Motion     Additional Passive Range of Motion Details  10 degrees of DF with knee flexion    Strength/Myotome Testing     Right Ankle/Foot   Plantar flexion: 5 (25 SLS calf raises)    See Exercise, Manual, and Modality Logs for complete treatment.       Assessment & Plan       Assessment  Assessment details: Pt has met all goals as noted below and will be discharged from PT to continue on his own with further strengthening and high impact activities when approved by doctor.      Goals  Plan Goals: STGs (4 weeks):    1.  Pt will be able to tolerated initial HEP. - MET  2.  Pt will demonstrate increased R ankle DF to neutral - MET  3.  Pt will be able to walk in boot without crutches.MET    LTGs (12 weeks):  1.  Pt will be I with HEP. - MET  2.  Pt will demonstrate R ankle DF to 20 degrees. MET  3.  Pt will be able to perform housework with no more than 2/10 pain level.  - MET    Plan  Therapy options: will not be seen for skilled therapy services        Progress per Plan of Care and Progress strengthening /stabilization /functional activity           Timed:  Manual Therapy:    8     mins  75174;  Therapeutic Exercise:         mins  61902;     Neuromuscular Melecio:  8      mins  79087;    Therapeutic Activity:     10     mins  30798;     Gait Training:           mins  96647;     Ultrasound:          mins  02980;    Electrical Stimulation:         mins  25920 ( );    Untimed:  Electrical Stimulation:         mins  84504  ( );  Mechanical Traction:         mins  82634;   Dry needling:       Self pay    Timed Treatment:   26   mins   Total Treatment:     26   mins  Aishwarya Albright PT, STANFORDT, CLT, TRINIDADN  Physical Therapist

## 2023-10-03 ENCOUNTER — OFFICE VISIT (OUTPATIENT)
Dept: PODIATRY | Facility: CLINIC | Age: 48
End: 2023-10-03
Payer: COMMERCIAL

## 2023-10-03 VITALS — WEIGHT: 175 LBS | BODY MASS INDEX: 25.05 KG/M2 | HEIGHT: 70 IN | RESPIRATION RATE: 20 BRPM

## 2023-10-03 DIAGNOSIS — M79.604 RIGHT LEG PAIN: Primary | ICD-10-CM

## 2023-10-03 DIAGNOSIS — S86.011A ACHILLES TENDON RUPTURE, RIGHT, INITIAL ENCOUNTER: ICD-10-CM

## 2023-10-03 NOTE — PROGRESS NOTES
"10/03/2023  Foot and Ankle Surgery - Established Patient/Follow-up  Provider: Dr. Guicho Woodson DPM  Location: [facility]    Subjective:  Zhane García is a 48 y.o. male.     Chief Complaint   Patient presents with    Right Lower Leg - Follow-up, Pain    Follow-up     WILDER Fairchild aprn 10/2022 date unknown       HPI: The patient is a 48-year-old male who presents to the clinic for a follow-up regarding his right Achilles tendon.    The patient was last seen approximately 6 weeks ago and states he is doing well. He reports he is improved 80 % to 85 % since his last visit. The patient states he walks 3 to 6 miles on the weekends. He notes mild stiffness in the morning when he first gets out of bed; however, he begins to loosen up after approximately 5 minutes. He reports he started exercising his quadriceps and increasing upper body weights, but he props his knee up on a bench to reduce pressure to his right lower extremity. The patient notes he is able to ascend and descend steps without any issues; however, he is not jogging up and down steps. He adds that he graduated from physical therapy approximately 2 weeks ago.     No Known Allergies    Current Outpatient Medications on File Prior to Visit   Medication Sig Dispense Refill    HYDROcodone-acetaminophen (NORCO) 5-325 MG per tablet Take 1 tablet by mouth Every 6 (Six) Hours As Needed for Severe Pain. (Patient not taking: Reported on 10/3/2023) 12 tablet 0    ibuprofen (ADVIL,MOTRIN) 600 MG tablet Take 1 tablet by mouth Every 6 (Six) Hours As Needed for Mild Pain. (Patient not taking: Reported on 10/3/2023) 30 tablet 0     No current facility-administered medications on file prior to visit.       Objective   Resp 20   Ht 177.8 cm (70\")   Wt 79.4 kg (175 lb)   BMI 25.11 kg/m²     Foot/Ankle Exam  GENERAL  Orientation:  AAOx3  Affect:  appropriate     VASCULAR      Right Foot Vascularity   Normal vascular exam    Dorsalis pedis:  2+  Posterior tibial:  " 2+  Skin temperature:  warm  Edema grading:  None  CFT:  < 3 seconds  Pedal hair growth:  Present  Varicosities:  none      Left Foot Vascularity   Normal vascular exam    Dorsalis pedis:  2+  Posterior tibial:  2+  Skin temperature:  warm  Edema grading:  None  CFT:  < 3 seconds  Pedal hair growth:  Present  Varicosities:  none     NEUROLOGIC      Right Foot Neurologic   Light touch sensation: normal  Hot/Cold sensation: normal  Achilles reflex:  2+      Left Foot Neurologic   Light touch sensation: normal  Hot/Cold sensation:  normal  Achilles reflex:  2+     MUSCULOSKELETAL      Right Foot Musculoskeletal   Arch:  Normal      Left Foot Musculoskeletal   Arch:  Normal     MUSCLE STRENGTH      Right Foot Muscle Strength   Normal strength    Foot dorsiflexion:  5  Foot plantar flexion:  5  Foot inversion:  5  Foot eversion:  5      Left Foot Muscle Strength   Normal strength    Foot dorsiflexion:  5  Foot plantar flexion:  5  Foot inversion:  5  Foot eversion:  5     DERMATOLOGIC       Right Foot Dermatologic   Skin  Right foot skin is intact.   Nails comment:  Nails 1-5      Left Foot Dermatologic   Skin  Left foot skin is intact.   Nails comment:  Nails 1-5     TESTS      Right Foot Tests   Anterior drawer: negative  Varus tilt: negative      Left Foot Tests   Anterior drawer: negative  Varus tilt: negative     Right foot additional comments: Moderate discomfort with palpation involving the myotendinous juncture of the right lower extremity. Significant inflammation and swelling to this area. No gross deformity or instability. Limited range of motion and muscle strength testing secondary to guarding.     0727/2023  Continued discomfort involving the calf region. Mild hypertrophy. No progressive disruption involving the Achilles tendon.     08/22/2023: Less swelling and discomfort involving the calf region. Strength is improving slowly. No additional issues or concerns today.    10/03/2023  Continued improvement  with less pain with palpation. Strength has improved as well to the right lower extremity. No new issues or concerns.    Assessment & Plan   Diagnoses and all orders for this visit:    1. Right leg pain (Primary)    2. Achilles tendon rupture, right, initial encounter        Patient returns for follow-up regarding his right Achilles tendon. He reports 80 % to 85 % improvement. No results were obtained or interpreted today. Discussed he is at an important transition period at this time. Advised him to be cautious, although do feel the likelihood of recurrence is low. Recommended he remain cautious with his left lower extremity as well given the Achilles history with his right lower extremity. 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 Advised light-weight, high-repetition may be a better option than heavy weights or excessive resistance training as it does not require as much demand. Reiterated the importance of continued daily stretching exercises. The patient will follow up on an as-needed basis. Greater than 20 minutes spent before, during, and after evaluation for patient care.    No orders of the defined types were placed in this encounter.         Note is dictated utilizing voice recognition software. Unfortunately this leads to occasional typographical errors. I apologize in advance if the situation occurs. If questions occur please do not hesitate to call our office.    Transcribed from ambient dictation for YOSI Woodson DPM by Tamy Rao.  10/03/23   09:15 EDT    Patient or patient representative verbalized consent to the visit recording.  I have personally performed the services described in this document as transcribed by the above individual, and it is both accurate and complete.

## 2023-11-06 ENCOUNTER — OFFICE VISIT (OUTPATIENT)
Dept: FAMILY MEDICINE CLINIC | Facility: CLINIC | Age: 48
End: 2023-11-06
Payer: COMMERCIAL

## 2023-11-06 VITALS
HEIGHT: 70 IN | OXYGEN SATURATION: 98 % | BODY MASS INDEX: 24.62 KG/M2 | WEIGHT: 172 LBS | SYSTOLIC BLOOD PRESSURE: 130 MMHG | DIASTOLIC BLOOD PRESSURE: 80 MMHG | HEART RATE: 70 BPM | RESPIRATION RATE: 18 BRPM

## 2023-11-06 DIAGNOSIS — L98.9 SKIN LESION: Primary | ICD-10-CM

## 2023-11-06 PROCEDURE — 99213 OFFICE O/P EST LOW 20 MIN: CPT | Performed by: NURSE PRACTITIONER

## 2023-11-06 NOTE — PROGRESS NOTES
Subjective   Zhane García is a 48 y.o. male.     History of Present Illness   Estab pt   Acute skin change on R shoulder. He does tan and this has become dry, raised and he has picked at this. No itch, no bleeding.     The following portions of the patient's history were reviewed and updated as appropriate: allergies, current medications, past family history, past medical history, past social history, past surgical history and problem list.    Review of Systems   Skin:  Positive for skin lesions.       Objective   Physical Exam  Vitals reviewed.   Constitutional:       Appearance: Normal appearance.   HENT:      Mouth/Throat:      Mouth: Mucous membranes are moist.   Cardiovascular:      Rate and Rhythm: Normal rate and regular rhythm.      Pulses: Normal pulses.      Heart sounds: Normal heart sounds.   Pulmonary:      Effort: Pulmonary effort is normal.      Breath sounds: Normal breath sounds.   Skin:     General: Skin is warm and dry.      Findings: Lesion present.             Comments: 0.5 cm raised, dry hyperpigmented lesion to top of R shoulder    Neurological:      General: No focal deficit present.      Mental Status: He is alert.         Vitals:    11/06/23 0723   BP: 130/80   Pulse: 70   Resp: 18   SpO2: 98%     Body mass index is 24.68 kg/m².    Procedures    Assessment & Plan   Problems Addressed this Visit    None  Visit Diagnoses       Skin lesion    -  Primary    Relevant Orders    Ambulatory Referral to Dermatology          Diagnoses         Codes Comments    Skin lesion    -  Primary ICD-10-CM: L98.9  ICD-9-CM: 709.9           Skin lesion- change in mole on R shoulder, refer derm, enc sunscreen , discouraged tanning beds         Education provided in AVS   No follow-ups on file.